# Patient Record
Sex: FEMALE | Race: WHITE | NOT HISPANIC OR LATINO | URBAN - METROPOLITAN AREA
[De-identification: names, ages, dates, MRNs, and addresses within clinical notes are randomized per-mention and may not be internally consistent; named-entity substitution may affect disease eponyms.]

---

## 2022-01-30 ENCOUNTER — INPATIENT (INPATIENT)
Facility: HOSPITAL | Age: 62
LOS: 1 days | Discharge: HOME | DRG: 286 | End: 2022-02-01
Attending: FAMILY MEDICINE | Admitting: HOSPITALIST
Payer: COMMERCIAL

## 2022-01-30 VITALS
WEIGHT: 244.93 LBS | RESPIRATION RATE: 16 BRPM | SYSTOLIC BLOOD PRESSURE: 153 MMHG | DIASTOLIC BLOOD PRESSURE: 81 MMHG | HEART RATE: 82 BPM | TEMPERATURE: 98 F | OXYGEN SATURATION: 99 % | HEIGHT: 66 IN

## 2022-01-30 LAB
ALBUMIN SERPL ELPH-MCNC: 3.7 G/DL — SIGNIFICANT CHANGE UP (ref 3.3–5.2)
ALP SERPL-CCNC: 131 U/L — HIGH (ref 40–120)
ALT FLD-CCNC: 24 U/L — SIGNIFICANT CHANGE UP
ANION GAP SERPL CALC-SCNC: 11 MMOL/L — SIGNIFICANT CHANGE UP (ref 5–17)
AST SERPL-CCNC: 31 U/L — SIGNIFICANT CHANGE UP
BASOPHILS # BLD AUTO: 0.05 K/UL — SIGNIFICANT CHANGE UP (ref 0–0.2)
BASOPHILS NFR BLD AUTO: 0.6 % — SIGNIFICANT CHANGE UP (ref 0–2)
BILIRUB SERPL-MCNC: 0.3 MG/DL — LOW (ref 0.4–2)
BUN SERPL-MCNC: 23.1 MG/DL — HIGH (ref 8–20)
CALCIUM SERPL-MCNC: 8.6 MG/DL — SIGNIFICANT CHANGE UP (ref 8.6–10.2)
CHLORIDE SERPL-SCNC: 106 MMOL/L — SIGNIFICANT CHANGE UP (ref 98–107)
CO2 SERPL-SCNC: 24 MMOL/L — SIGNIFICANT CHANGE UP (ref 22–29)
CREAT SERPL-MCNC: 0.74 MG/DL — SIGNIFICANT CHANGE UP (ref 0.5–1.3)
D DIMER BLD IA.RAPID-MCNC: 188 NG/ML DDU — SIGNIFICANT CHANGE UP
EOSINOPHIL # BLD AUTO: 0.25 K/UL — SIGNIFICANT CHANGE UP (ref 0–0.5)
EOSINOPHIL NFR BLD AUTO: 2.9 % — SIGNIFICANT CHANGE UP (ref 0–6)
FLUAV AG NPH QL: SIGNIFICANT CHANGE UP
FLUBV AG NPH QL: SIGNIFICANT CHANGE UP
GLUCOSE SERPL-MCNC: 99 MG/DL — SIGNIFICANT CHANGE UP (ref 70–99)
HCT VFR BLD CALC: 36.7 % — SIGNIFICANT CHANGE UP (ref 34.5–45)
HGB BLD-MCNC: 11.8 G/DL — SIGNIFICANT CHANGE UP (ref 11.5–15.5)
IMM GRANULOCYTES NFR BLD AUTO: 0.6 % — SIGNIFICANT CHANGE UP (ref 0–1.5)
LIDOCAIN IGE QN: 18 U/L — LOW (ref 22–51)
LYMPHOCYTES # BLD AUTO: 2.06 K/UL — SIGNIFICANT CHANGE UP (ref 1–3.3)
LYMPHOCYTES # BLD AUTO: 23.6 % — SIGNIFICANT CHANGE UP (ref 13–44)
MAGNESIUM SERPL-MCNC: 2 MG/DL — SIGNIFICANT CHANGE UP (ref 1.6–2.6)
MCHC RBC-ENTMCNC: 26.9 PG — LOW (ref 27–34)
MCHC RBC-ENTMCNC: 32.2 GM/DL — SIGNIFICANT CHANGE UP (ref 32–36)
MCV RBC AUTO: 83.8 FL — SIGNIFICANT CHANGE UP (ref 80–100)
MONOCYTES # BLD AUTO: 0.77 K/UL — SIGNIFICANT CHANGE UP (ref 0–0.9)
MONOCYTES NFR BLD AUTO: 8.8 % — SIGNIFICANT CHANGE UP (ref 2–14)
NEUTROPHILS # BLD AUTO: 5.54 K/UL — SIGNIFICANT CHANGE UP (ref 1.8–7.4)
NEUTROPHILS NFR BLD AUTO: 63.5 % — SIGNIFICANT CHANGE UP (ref 43–77)
NT-PROBNP SERPL-SCNC: 29 PG/ML — SIGNIFICANT CHANGE UP (ref 0–300)
PLATELET # BLD AUTO: 203 K/UL — SIGNIFICANT CHANGE UP (ref 150–400)
POTASSIUM SERPL-MCNC: 4.1 MMOL/L — SIGNIFICANT CHANGE UP (ref 3.5–5.3)
POTASSIUM SERPL-SCNC: 4.1 MMOL/L — SIGNIFICANT CHANGE UP (ref 3.5–5.3)
PROT SERPL-MCNC: 6.7 G/DL — SIGNIFICANT CHANGE UP (ref 6.6–8.7)
RBC # BLD: 4.38 M/UL — SIGNIFICANT CHANGE UP (ref 3.8–5.2)
RBC # FLD: 16.4 % — HIGH (ref 10.3–14.5)
RSV RNA NPH QL NAA+NON-PROBE: SIGNIFICANT CHANGE UP
SARS-COV-2 RNA SPEC QL NAA+PROBE: DETECTED
SODIUM SERPL-SCNC: 141 MMOL/L — SIGNIFICANT CHANGE UP (ref 135–145)
TROPONIN T SERPL-MCNC: <0.01 NG/ML — SIGNIFICANT CHANGE UP (ref 0–0.06)
WBC # BLD: 8.72 K/UL — SIGNIFICANT CHANGE UP (ref 3.8–10.5)
WBC # FLD AUTO: 8.72 K/UL — SIGNIFICANT CHANGE UP (ref 3.8–10.5)

## 2022-01-30 PROCEDURE — 99285 EMERGENCY DEPT VISIT HI MDM: CPT

## 2022-01-30 PROCEDURE — 71045 X-RAY EXAM CHEST 1 VIEW: CPT | Mod: 26

## 2022-01-30 PROCEDURE — 93010 ELECTROCARDIOGRAM REPORT: CPT

## 2022-01-30 RX ORDER — ASPIRIN/CALCIUM CARB/MAGNESIUM 324 MG
324 TABLET ORAL ONCE
Refills: 0 | Status: COMPLETED | OUTPATIENT
Start: 2022-01-30 | End: 2022-01-30

## 2022-01-30 RX ADMIN — Medication 324 MILLIGRAM(S): at 21:41

## 2022-01-30 NOTE — ED ADULT NURSE NOTE - OBJECTIVE STATEMENT
assumed pt 2230, pt a&ox3 states she bagan to have heart palpitations at home with SOB. pt states when the pain continued she called 911. pt placed on CM currently normal sinus, spo2 WNL pt states the pain and discomfort has improved since arrival to hospital.  Updated on the plan of care. Call bell within reach, bed locked in lowest position.  No signs of acute distress noted, safety maintained. .

## 2022-01-30 NOTE — ED PROVIDER NOTE - PROGRESS NOTE DETAILS
spoke with Dr. Bose, who review Patient history and noted that last year she had CT cardiac, calcium score of 184, and localized calcium score of 86 to LAD, and PET scan that showed mild ischemia. At this time, he requests aspirin and management of symptoms if they return. Patient will go to cath lab tomorrow. Patient resting comfortably.

## 2022-01-30 NOTE — ED ADULT TRIAGE NOTE - CHIEF COMPLAINT QUOTE
BIBEMS from home complaining of palpitations and BL finger tingling since 430p today. Also reports that en route she started having chest pressure. PMH of cardiac cath 15 years PTA with no stents on Lipitor for HLD. VSS.

## 2022-01-30 NOTE — ED PROVIDER NOTE - OBJECTIVE STATEMENT
62 y/o female with PMHx of arterial sclerosis, breast cancer and double mastectomy presents to the ED c/o palpitations. Patient was BIB EMS. Patient reports frequent palpitation that started 4 hours ago, accompanied by numb fingers, shoulder and chest discomfort, trouble breathing and tachycardia. Patient states palpitations have been improving fot the [ast 20 minutes, but still feel tingling   188/98 BP  no hx of HTN, calf pain, or swelling.   Denies N/V/D or chest pain in the past few days. Quit smoking 30 years ago. 62 y/o female with PMHx of arterial atherosclerosis, breast cancer, and double mastectomy presents to the ED c/o palpitations. Patient was BIB EMS and her BP was 188/98. Patient reports frequent palpitation that started 4 hours ago, accompanied by numb fingers, shoulder and chest discomfort, trouble breathing and tachycardia. Patient states palpitations have been improving for the last 20 minutes, but she still feels tingling. Denies Hx of HTN, calf pain, or swelling.   Denies fever, N/V/D or chest pain during the past few days. Quit smoking 30 years ago.

## 2022-01-31 ENCOUNTER — TRANSCRIPTION ENCOUNTER (OUTPATIENT)
Age: 62
End: 2022-01-31

## 2022-01-31 DIAGNOSIS — Z98.890 OTHER SPECIFIED POSTPROCEDURAL STATES: Chronic | ICD-10-CM

## 2022-01-31 DIAGNOSIS — I24.9 ACUTE ISCHEMIC HEART DISEASE, UNSPECIFIED: ICD-10-CM

## 2022-01-31 LAB
BLD GP AB SCN SERPL QL: SIGNIFICANT CHANGE UP
TROPONIN T SERPL-MCNC: <0.01 NG/ML — SIGNIFICANT CHANGE UP (ref 0–0.06)
TSH SERPL-MCNC: 1.82 UIU/ML — SIGNIFICANT CHANGE UP (ref 0.27–4.2)

## 2022-01-31 PROCEDURE — 99222 1ST HOSP IP/OBS MODERATE 55: CPT

## 2022-01-31 PROCEDURE — 93010 ELECTROCARDIOGRAM REPORT: CPT | Mod: 76

## 2022-01-31 PROCEDURE — 12345: CPT | Mod: NC

## 2022-01-31 RX ORDER — ATORVASTATIN CALCIUM 80 MG/1
1 TABLET, FILM COATED ORAL
Qty: 30 | Refills: 0
Start: 2022-01-31

## 2022-01-31 RX ORDER — LANOLIN ALCOHOL/MO/W.PET/CERES
3 CREAM (GRAM) TOPICAL AT BEDTIME
Refills: 0 | Status: DISCONTINUED | OUTPATIENT
Start: 2022-01-31 | End: 2022-02-01

## 2022-01-31 RX ORDER — ASPIRIN/CALCIUM CARB/MAGNESIUM 324 MG
1 TABLET ORAL
Qty: 0 | Refills: 0 | DISCHARGE
Start: 2022-01-31

## 2022-01-31 RX ORDER — ONDANSETRON 8 MG/1
4 TABLET, FILM COATED ORAL EVERY 8 HOURS
Refills: 0 | Status: DISCONTINUED | OUTPATIENT
Start: 2022-01-31 | End: 2022-02-01

## 2022-01-31 RX ORDER — ATORVASTATIN CALCIUM 80 MG/1
1 TABLET, FILM COATED ORAL
Qty: 0 | Refills: 0 | DISCHARGE
Start: 2022-01-31

## 2022-01-31 RX ORDER — ASPIRIN/CALCIUM CARB/MAGNESIUM 324 MG
325 TABLET ORAL DAILY
Refills: 0 | Status: DISCONTINUED | OUTPATIENT
Start: 2022-01-31 | End: 2022-02-01

## 2022-01-31 RX ORDER — ACETAMINOPHEN 500 MG
650 TABLET ORAL EVERY 6 HOURS
Refills: 0 | Status: DISCONTINUED | OUTPATIENT
Start: 2022-01-31 | End: 2022-02-01

## 2022-01-31 RX ORDER — SODIUM CHLORIDE 9 MG/ML
1000 INJECTION, SOLUTION INTRAVENOUS
Refills: 0 | Status: DISCONTINUED | OUTPATIENT
Start: 2022-01-31 | End: 2022-02-01

## 2022-01-31 RX ORDER — NITROGLYCERIN 6.5 MG
0.4 CAPSULE, EXTENDED RELEASE ORAL
Refills: 0 | Status: DISCONTINUED | OUTPATIENT
Start: 2022-01-31 | End: 2022-02-01

## 2022-01-31 RX ORDER — ATORVASTATIN CALCIUM 80 MG/1
1 TABLET, FILM COATED ORAL
Qty: 0 | Refills: 0 | DISCHARGE

## 2022-01-31 RX ORDER — ASPIRIN/CALCIUM CARB/MAGNESIUM 324 MG
81 TABLET ORAL ONCE
Refills: 0 | Status: COMPLETED | OUTPATIENT
Start: 2022-01-31 | End: 2022-01-31

## 2022-01-31 RX ORDER — ATORVASTATIN CALCIUM 80 MG/1
40 TABLET, FILM COATED ORAL AT BEDTIME
Refills: 0 | Status: DISCONTINUED | OUTPATIENT
Start: 2022-01-31 | End: 2022-02-01

## 2022-01-31 RX ADMIN — Medication 81 MILLIGRAM(S): at 16:46

## 2022-01-31 RX ADMIN — SODIUM CHLORIDE 100 MILLILITER(S): 9 INJECTION, SOLUTION INTRAVENOUS at 04:44

## 2022-01-31 NOTE — DISCHARGE NOTE PROVIDER - NSDCCPCAREPLAN_GEN_ALL_CORE_FT
PRINCIPAL DISCHARGE DIAGNOSIS  Diagnosis: Chest pain  Assessment and Plan of Treatment: ACS ruled out.  Continue Aspirin and Lipitor.  Follow up with Cardio in 1-2 weeks as scheduled.

## 2022-01-31 NOTE — H&P ADULT - ASSESSMENT
60 y/o female with episode of CP/Palpitations, Hx of Breast CA s/p B/L Mastectomy, s/p B/L reconstruction, HLD    Chest Pain:  -Currently no evidence of ACS  -No prior EKG for comparison  -Serial Trop/EKG  -Check Echo   -Check TSH   -DDimer neg, no hx of VTE  -Cont. Daily aspirin, statin  -Check A1c, FLP  -Likely LHC with reported pos. Cardiac CT, abnormal EKG, prior smoker, pos. family hx, and hx of HLD   -OOB, Ambulate  -NPO except meds pending Cardiology eval    HLD:  -Statin  -Check FLP    Breast reconstruction:  -No reported issues  -F/U with Breast surgeon as o/p     Discussed with ED, Patient  62 y/o female with episode of CP/Palpitations, COVID PCR pos. Hx of Breast CA s/p B/L Mastectomy, s/p B/L reconstruction, HLD    Chest Pain:  -Currently no evidence of ACS  -No prior EKG for comparison  -Serial Trop/EKG  -Check Echo   -Check TSH   -DDimer neg, no hx of VTE  -Cont. Daily aspirin, statin  -Check A1c, FLP  -Likely LHC with reported pos. Cardiac CT, abnormal EKG, prior smoker, pos. family hx, and hx of HLD   -OOB, Ambulate  -NPO except meds pending Cardiology eval    HLD:  -Statin  -Check FLP    Breast reconstruction:  -No reported issues  -F/U with Breast surgeon as o/p     COVID PCR pos.  -s/p vaccine and booster  -Reportedly had COVID symptoms in 11/21  -Not hypoxic, CXR as above  -Isolation, supportive care  -No indication for decadron/Remdesivir     Discussed with ED, Patient

## 2022-01-31 NOTE — DISCHARGE NOTE PROVIDER - NSDCMRMEDTOKEN_GEN_ALL_CORE_FT
Adult Aspirin Regimen 81 mg oral delayed release tablet: 1 tab(s) orally once a day  atorvastatin 40 mg oral tablet: 1 tab(s) orally once a day (at bedtime)

## 2022-01-31 NOTE — PROGRESS NOTE ADULT - SUBJECTIVE AND OBJECTIVE BOX
Patient was seen and examined at bedside around 8 am.  No more chest pain.  Denies shortness of breath, palpitations, nausea, vomiting, abdominal pain, headache or dizziness.    PHYSICAL EXAM:  Vital Signs   T(C): 36.8 (31 Jan 2022 09:09), Max: 36.9 (30 Jan 2022 19:53)  T(F): 98.3 (31 Jan 2022 09:09), Max: 98.4 (30 Jan 2022 19:53)  HR: 65 (31 Jan 2022 09:09) (65 - 82)  BP: 121/71 (31 Jan 2022 09:09) (121/71 - 153/81)  BP(mean): 95 (31 Jan 2022 04:07) (95 - 95)  RR: 16 (31 Jan 2022 09:09) (16 - 19)  SpO2: 98% (31 Jan 2022 09:09) (97% - 99%)  General: Elderly female sitting in bed comfortably. No acute distress  HEENT: EOMI. Clear conjunctivae. Moist mucus membrane  Neck: Supple.   Chest: CTA bilaterally - no wheezing, rales or rhonchi. + chest wall tenderness (recent surgery).   Heart: Normal S1 & S2 with RRR.    Abdomen: Soft. Mild tenderness around incisions from recent surgery. Non-distended. + BS  Ext: No pedal edema. No calf tenderness   Neuro: AAO x 3. No focal deficit. No speech disorder.  Skin: Warm and Dry  Psychiatry: Normal mood and affect    Labs, Radiology and EKG reviewed.    Plan:  Continue current treatment.  ECHO pending.  Possible cardiac cath today.    Please refer to H&P from earlier today for more details.      Discussed with Cardio and RN. 
                                                                                                Cardiac Cath NP Note           Patient is a 61y old  Female who presents with a chief complaint of Chest pain  Abnormal EKG (2022 10:21)    Overnight events: None   Plan: [facility] with Dr. Alejo Barahona     HPI:  62 y/o female with hx of HLD, breast cancer s/p B/L mastectomy 3/21 with reconstruction 10/21. No use of chemo/XRT. S/P C 15 years ago reported as normal, but states had cardiac CT in  showing "calcifications." She is normally fully functional with no limitations. She was in her normal state of health until yesterday evening when she had SSCP associated with palpitations and diaphoresis. Normally she can walk up multiple flights of stairs with no anginal symptoms. She is s/p COVID vaccine with booster in , but also states she had COVID in . Denies any recent fever, chills, N/V, SOB, HA, or LE edema. She called EMS with above. In ED initially hypertensive but not hypoxic, EKG with septal Q waves, initial Trop neg. Labs WNL, except COVID PCR positive.  (2022 04:07)      PAST MEDICAL & SURGICAL HISTORY:  Breast cancer    S/P mastectomy    Arterial atherosclerosis    H/O breast reconstruction        RADIOLOGY & ADDITIONAL STUDIES/DIAGNOSTIC TESTING:      ECG: NSR without ischemic changes     ECHO: 3/2020 normal normal EF without any signification valvular disease     CT Calcium scoring 2020 total coronary calcium score 184, , RCA 12    STRESS TEST: NUC equivocal      CARDIAC CATHETERIZATION: , normal LV sys function, nml LAD, 20% mRCA, 10% OM2         Allergies    No Known Allergies    Intolerances        MEDICATIONS  (STANDING):  aspirin  chewable 81 milliGRAM(s) Oral once  aspirin enteric coated 325 milliGRAM(s) Oral daily  atorvastatin 40 milliGRAM(s) Oral at bedtime  dextrose 5% + sodium chloride 0.9%. 1000 milliLiter(s) (100 mL/Hr) IV Continuous <Continuous>    MEDICATIONS  (PRN):  acetaminophen     Tablet .. 650 milliGRAM(s) Oral every 6 hours PRN Temp greater or equal to 38C (100.4F), Mild Pain (1 - 3)  aluminum hydroxide/magnesium hydroxide/simethicone Suspension 30 milliLiter(s) Oral every 4 hours PRN Dyspepsia  melatonin 3 milliGRAM(s) Oral at bedtime PRN Insomnia  nitroglycerin     SubLingual 0.4 milliGRAM(s) SubLingual every 5 minutes PRN Chest Pain  ondansetron Injectable 4 milliGRAM(s) IV Push every 8 hours PRN Nausea and/or Vomiting      FAMILY HISTORY:  FH: CAD (coronary artery disease) (Father, Mother)        SOCIAL HISTORY:     CIGARETTES: denies     ALCOHOL: rarely     REVIEW OF SYSTEMS:  General: No fevers/chills, or fatigue  HEENT: No visual disturbances, no hearing loss, no headaches, no epistaxis.  CV: No chest pain,no palpitations, no edema, no orthopnea, no PND, or VENCES  Respiratory: No dyspnea, no wheeze, no cough.  GI: no nausea/vomiting, no black/bloody stools.  : No hematuria  Musculoskeletal: No myalgias, no arthralgias, no back pain.    Vital Signs Last 24 Hrs  T(C): 36.7 (2022 16:23), Max: 36.9 (2022 19:53)  T(F): 98 (2022 16:23), Max: 98.4 (2022 19:53)  HR: 66 (2022 16:46) (65 - 82)  BP: 151/71 (2022 16:46) (121/71 - 153/81)  BP(mean): 95 (2022 04:07) (95 - 95)  RR: 18 (2022 16:46) (16 - 19)  SpO2: 100% (2022 16:46) (95% - 100%)    Daily Height in cm: 167.64 (2022 19:53)    Daily     I&O's Detail      PHYSICAL EXAM:   GENERAL: Pt lying comfortably, NAD.  ENMT: PERRL, +EOMI.  NECK: soft, Supple, No JVD,   CHEST/LUNG: Clear to auscultatation bilaterally; No wheezing.  HEART: S1S2+, Regular rate and rhythm; No murmurs.  ABDOMEN: Soft, Nontender, Nondistended; Bowel sounds present.  MUSCULOSKELETAL: Normal range of motion.  SKIN: No rashes or lesions.  NEURO: AAOX3, no focal deficits, no motor r sensory loss.  PSYCH: normal mood.  VASCULAR:   Radial +2 R/+2 L  Femoral +2 R/+2 L  PT +2 R/+2 L  DP +2 R/+2 L      INTERPRETATION OF TELEMETRY:    EC NSR     LABS:                        11.8   8.72  )-----------( 203      ( 2022 21:26 )             36.7         141  |  106  |  23.1<H>  ----------------------------<  99  4.1   |  24.0  |  0.74    Ca    8.6      2022 21:26  Mg     2.0         TPro  6.7  /  Alb  3.7  /  TBili  0.3<L>  /  DBili  x   /  AST  31  /  ALT  24  /  AlkPhos  131<H>      CARDIAC MARKERS ( 2022 08:04 )  x     / <0.01 ng/mL / x     / x     / x      CARDIAC MARKERS ( 2022 21:26 )  x     / <0.01 ng/mL / x     / x     / x              I&O's Summary    BNPSerum Pro-Brain Natriuretic Peptide: 29 pg/mL ( @ 21:26)    Serum Pro-Brain Natriuretic Peptide: 29 pg/mL (22 @ 21:26)            
[place] [facility] - [facility]                                   [address] [address]                                                      PHONE: [phone]                                                         FAX: [phone]  [email or web address]  ---------------------------------------------------------------------------------------------------------------------------------    Please see cath report.      Non significant disease of LAD (negative iFR) for med tx  ASA and Statin.  Observe tele tonight and hopeful dc home in am    Alejo Barahona MD    Office [phone]  Cell     [phone]

## 2022-01-31 NOTE — DISCHARGE NOTE PROVIDER - PROVIDER TOKENS
PROVIDER:[TOKEN:[[id number]],FOLLOWUP:[2 weeks]],PROVIDER:[TOKEN:[[id number]],FOLLOWUP:[Routine]]

## 2022-01-31 NOTE — CONSULT NOTE ADULT - SUBJECTIVE AND OBJECTIVE BOX
[place] [facility] - [facility], [facility]                                   [address] [place] [address]                                                      PHONE: [phone]                                                         FAX: [phone]  [email or web address]  ---------------------------------------------------------------------------------------------------------------------------------    Reason for Consult: chest pain     HPI:  POLINA HERNÁNDEZ is an 61y Female with history of HLD CAD prior CT coronary calcium 184 to LAD territory with equivocal NUC1/2020, breast cancer s/p B/L mastectomy 3/21 with reconstruction 10/21 without chemo/XRT. Patient is normally fully functional with no limitations. She was in her normal state of health until yesterday evening when she had substernal chest pain associated with palpitations and diaphoresis. Normally she can walk up multiple flights of stairs with no anginal symptoms. She is s/p COVID vaccine with booster in 11/21, but also states she had COVID in 11/21. Denies any recent fever, chills, N/V, SOB, HA, or LE edema. She called EMS with above. D-Dimer normal and trop negative thus far.      PAST MEDICAL & SURGICAL HISTORY:  Breast cancer    S/P mastectomy    Arterial atherosclerosis    H/O breast reconstruction        No Known Allergies      MEDICATIONS  (STANDING):  aspirin enteric coated 325 milliGRAM(s) Oral daily  atorvastatin 40 milliGRAM(s) Oral at bedtime  dextrose 5% + sodium chloride 0.9%. 1000 milliLiter(s) (100 mL/Hr) IV Continuous <Continuous>    MEDICATIONS  (PRN):  acetaminophen     Tablet .. 650 milliGRAM(s) Oral every 6 hours PRN Temp greater or equal to 38C (100.4F), Mild Pain (1 - 3)  aluminum hydroxide/magnesium hydroxide/simethicone Suspension 30 milliLiter(s) Oral every 4 hours PRN Dyspepsia  melatonin 3 milliGRAM(s) Oral at bedtime PRN Insomnia  nitroglycerin     SubLingual 0.4 milliGRAM(s) SubLingual every 5 minutes PRN Chest Pain  ondansetron Injectable 4 milliGRAM(s) IV Push every 8 hours PRN Nausea and/or Vomiting      Social History:  Cigarettes:      none               Alchohol:       none           Illicit Drug Abuse:  none     ROS: Negative other than as mentioned in HPI.    Vital Signs Last 24 Hrs  T(C): 36.8 (31 Jan 2022 09:09), Max: 36.9 (30 Jan 2022 19:53)  T(F): 98.3 (31 Jan 2022 09:09), Max: 98.4 (30 Jan 2022 19:53)  HR: 65 (31 Jan 2022 09:09) (65 - 82)  BP: 121/71 (31 Jan 2022 09:09) (121/71 - 153/81)  BP(mean): 95 (31 Jan 2022 04:07) (95 - 95)  RR: 16 (31 Jan 2022 09:09) (16 - 19)  SpO2: 98% (31 Jan 2022 09:09) (97% - 99%)  ICU Vital Signs Last 24 Hrs  POLINA HERNÁNDEZ  I&O's Detail    I&O's Summary    Drug Dosing Weight  POLINA HERNÁNDEZ      PHYSICAL EXAM:  General: Appears well developed, alert and cooperative.  HEENT: Head; normocephalic, atraumatic.  Eyes: Pupils reactive, cornea wnl.  Neck: Supple, no nodes adenopathy, no NVD or carotid bruit or thyromegaly.  CARDIOVASCULAR: Normal S1 and S2, 2/6 murmur, rub, gallop or lift.   LUNGS: No rales, rhonchi or wheeze. Normal breath sounds bilaterally.  ABDOMEN: Soft, nontender without mass or organomegaly. bowel sounds normoactive.  EXTREMITIES: No clubbing, cyanosis or edema. Distal pulses wnl.   SKIN: warm and dry with normal turgor.  NEURO: Alert/oriented x 3/normal motor exam. No pathologic reflexes.    PSYCH: normal affect.        LABS:                        11.8   8.72  )-----------( 203      ( 30 Jan 2022 21:26 )             36.7     01-30    141  |  106  |  23.1<H>  ----------------------------<  99  4.1   |  24.0  |  0.74    Ca    8.6      30 Jan 2022 21:26  Mg     2.0     01-30    TPro  6.7  /  Alb  3.7  /  TBili  0.3<L>  /  DBili  x   /  AST  31  /  ALT  24  /  AlkPhos  131<H>  01-30    POLINA HERNÁNDEZ  CARDIAC MARKERS ( 31 Jan 2022 08:04 )  x     / <0.01 ng/mL / x     / x     / x      CARDIAC MARKERS ( 30 Jan 2022 21:26 )  x     / <0.01 ng/mL / x     / x     / x                RADIOLOGY & ADDITIONAL STUDIES:    INTERPRETATION OF TELEMETRY (personally reviewed):    ECG: NSR without ischemic changes     ECHO: 3/2020 normal normal EF without any signification valvular disease         STRESS TEST: NUC equivocal      CARDIAC CATHETERIZATION: Pending     Assessment and Plan:  In summary, POLINA HERNÁNDEZ is an 61y Female with past medical history significant for with history of HLD CAD prior CT coronary calcium 184 to LAD territory with equivocal NUC1/2020, breast cancer s/p B/L mastectomy 3/21 with reconstruction 10/21 without chemo/XRT. Patient is normally fully functional with no limitations. She was in her normal state of health until yesterday evening when she had substernal chest pain associated with palpitations and diaphoresis. Normally she can walk up multiple flights of stairs with no anginal symptoms. She is s/p COVID vaccine with booster in 11/21, but also states she had COVID in 11/21. Denies any recent fever, chills, N/V, SOB, HA, or LE edema. She called EMS with above. D-Dimer normal and trop negative thus far.       Left heart cath   Awaiting TTE to re-evaluate LV EF and rule out significant valvular disease   ASA and statin therapy

## 2022-01-31 NOTE — DISCHARGE NOTE PROVIDER - ATTENDING DISCHARGE PHYSICAL EXAMINATION:
PHYSICAL EXAM:  Vital Signs   T(C): 36.4 (01 Feb 2022 04:00), Max: 36.7 (31 Jan 2022 16:23)  T(F): 97.5 (01 Feb 2022 04:00), Max: 98 (31 Jan 2022 16:23)  HR: 60 (01 Feb 2022 04:00) (60 - 77)  BP: 130/80 (01 Feb 2022 04:00) (130/80 - 159/83)  RR: 18 (01 Feb 2022 04:00) (18 - 18)  SpO2: 95% (01 Feb 2022 04:00) (94% - 100%)  General: Elderly female sitting in bed comfortably. No acute distress  HEENT: EOMI. Clear conjunctivae. Moist mucus membrane  Neck: Supple.   Chest: CTA bilaterally - no wheezing, rales or rhonchi. + chest wall tenderness (recent surgery).   Heart: Normal S1 & S2 with RRR.    Abdomen: Soft. Mild tenderness around incisions from recent surgery. Non-distended. + BS  Ext: No pedal edema. No calf tenderness   Neuro: AAO x 3. No focal deficit. No speech disorder.  Skin: Warm and Dry  Psychiatry: Normal mood and affect

## 2022-01-31 NOTE — H&P ADULT - REASON FOR ADMISSION
TCM assessment completed.  The patient is scheduled for a TCM/HFU with you on 12/09/2024.  Thank you. 
Chest pain  Abnormal EKG

## 2022-01-31 NOTE — ED ADULT NURSE REASSESSMENT NOTE - NS ED NURSE REASSESS COMMENT FT1
pt aaox3 breathing on ra no sob no cp pt being admitted pt npo pt is going for a procedure vs stable no distress noted
pt remains on CM currently NS and  spo2 WNL, awaiting results and plan of care from MD. no pain or discomfort at this time pt currently resting on stretcher, safety maintained

## 2022-01-31 NOTE — DISCHARGE NOTE NURSING/CASE MANAGEMENT/SOCIAL WORK - PATIENT PORTAL LINK FT
You can access the FollowMyHealth Patient Portal offered by [facility] by registering at the following website: [email or web address]. By joining [facility]’s FollowMyHealth portal, you will also be able to view your health information using other applications (apps) compatible with our system.

## 2022-01-31 NOTE — DISCHARGE NOTE NURSING/CASE MANAGEMENT/SOCIAL WORK - NSDCPEFALRISK_GEN_ALL_CORE
For information on Fall & Injury Prevention, visit: [email or web address] OR  [email or web address] OR  https://www.cdc.gov/steadi/patient.html

## 2022-01-31 NOTE — PROGRESS NOTE ADULT - ASSESSMENT
Risk Assessments:  ASA: 2  Mallampati: 2  GFR: 87  Cr: 0.74  BRA: 1.9      Impression:    Plan:    -plan for [facility] with Dr. Alejo Barahona   -patient seen and examined  -confirmed appropriate NPO duration  -ECG and Labs reviewed  -Aspirin 81mg po pre-cath  -procedure discussed with patient; risks and benefits explained, questions answered  -consent obtained by attending IC Risk Assessments:  ASA: 2  Mallampati: 2  GFR: 87  Cr: 0.74  BRA: 1.9    Plan:    -plan for [facility] with Dr. Alejo Barahona   -patient seen and examined  -confirmed appropriate NPO duration  -ECG and Labs reviewed  -Aspirin 81mg po pre-cath  -procedure discussed with patient; risks and benefits explained, questions answered  -consent obtained by attending IC                                     POST CATH ADDENDUM    Now s/p LHC via RFA with Dr. Barahona, tolerated procedure well. Pt arrived to recovery in NAD and Hemodynamically stable,  access site stable, no bleed/hematoma, distal pulse +, neurovascular intact.   Intraprocedurally: Pt received IV sedation, Heparin 12,000 units IV, Ancef 2g IVPB, Omnipaque 108ml, closure device MNYX   Findings:   < from: Cardiac Catheterization (22 @ 18:25) >  [facility]  Department of Cardiology  [address] [place] [address]  [phone]  Cath Lab Report -- Comprehensive Report  Patient: POLINA HERNÁNDEZ  Study date: 2022  Account number: [id number]  MR number: [id number]  : 1960  Gender: Female  Race: W  Case Physician(s):  Alejo Barahona MD  Referring Physician:  INDICATIONS: Unstable angina - CCS4.  HISTORY: The patient has hypertension and medication-treated dyslipidemia.  PROCEDURE:  --  Left heart catheterization with ventriculography.  --  Left coronary angiography.  --  Right coronary angiography.  --  Sonosite.  --  Pressure Wire procedure.  --  Hemostasis with Perclose.  TECHNIQUE: Cardiac catheterization performed electively.  Local anesthetic given. Right femoral artery access. Sheath exchange. The  sheath in the right femoral artery was exchanged. Left heart  catheterization. Ventriculography was performed. Left coronary artery  angiography. The vessel was injected utilizing a catheter. Right coronary  artery angiography. The vessel was injected utilizing a catheter.  Sonosite. Pressure Wire procedure. Hemostasis with Perclose. RADIATION  EXPOSURE: 7.8 min.  CONTRAST GIVEN: Omnipaque 108 ml.  MEDICATIONS GIVEN: Midazolam, 1 mg, IV. Fentanyl, 50 mcg, IV. Midazolam,  0.5 mg, IV. Midazolam, 0.5 mg, IV. Nitroglycerin, 200 mcg, intracoronary.  Heparin, 57713 units, IV. 1% Lidocaine, 10 ml, subcutaneously. 0.9NS, 200  ml, IV. Ancef, 2 g, IV.  VENTRICLES: LVEF 55%  CORONARY VESSELS: The coronary circulation is right dominant.  LM:   --  LM: Normal.  LAD:   --  LAD: iFR index of 0.91  --  Mid LAD: There was a 50 % stenosis.  CX:   --  Circumflex: Normal.  RCA:   --  Proximal RCA: There was a 20 % stenosis.  --  Mid RCA: There was a 20% stenosis.  COMPLICATIONS: There were no complications. No complications occurred  during the cath lab visit.  DIAGNOSTIC IMPRESSIONS: Non significant LAD disease (negative iFR)  DIAGNOSTIC RECOMMENDATIONS: of Medically refractory symptoms then PCI of  LAD The patient should continue with the present medications. ASA and  statin  INTERVENTIONAL IMPRESSIONS: Non significant LAD disease (negative iFR)  INTERVENTIONAL RECOMMENDATIONS: of Medically refractory symptoms then PCI  of LAD  Prepared and signed by  Alejo Barahona MD  Signed 2022 19:18:58        Plan:  -Dispo return to 5 tower IP room, after 2 hours bedrest and post procedure monitoring   -Post procedure management/monitoring per protocol  -Access site precautions  -Bedrest x 2 hours post procedure until 2130  -Labs and EKG in am  -medical mgmt with ASA and high dose statin   -Cont statin therapy with Lipitor 40mg daily  -Educated regarding post procedure management and care  -Discussed the importance of RF modification  -F/U outpt in 1-2 weeks with Cardiologist Dr. Hunter   -DISPO: Plan for D/C in am if remains HDS, ECG and labs in am stable and without complications    Plan of care discussed with patient and Dr. Alejo Barahona  Risk Assessments:  ASA: 2  Mallampati: 2  GFR: 87  Cr: 0.74  BRA: 1.9    Plan:    -plan for [facility] with Dr. Alejo Barahona   -patient seen and examined  -confirmed appropriate NPO duration  -ECG and Labs reviewed  -Aspirin 81mg po pre-cath  -procedure discussed with patient; risks and benefits explained, questions answered  -consent obtained by attending IC                                     POST CATH ADDENDUM    Now s/p LHC via RFA with Dr. Barahona, tolerated procedure well. Pt arrived to recovery in NAD and Hemodynamically stable,  access site stable, no bleed/hematoma, distal pulse +, neurovascular intact.   Intraprocedurally: Pt received IV sedation, Heparin 12,000 units IV, Ancef 2g IVPB, Omnipaque 108ml, closure device MNYX   Findings:   < from: Cardiac Catheterization (22 @ 18:25) >  [facility]  Department of Cardiology  [address] [place] [address]  [phone]  Cath Lab Report -- Comprehensive Report  Patient: POLINA HERNÁNDEZ  Study date: 2022  Account number: [id number]  MR number: [id number]  : 1960  Gender: Female  Race: W  Case Physician(s):  Alejo Barahona MD  Referring Physician:  INDICATIONS: Unstable angina - CCS4.  HISTORY: The patient has hypertension and medication-treated dyslipidemia.  PROCEDURE:  --  Left heart catheterization with ventriculography.  --  Left coronary angiography.  --  Right coronary angiography.  --  Sonosite.  --  Pressure Wire procedure.  --  Hemostasis with Perclose.  TECHNIQUE: Cardiac catheterization performed electively.  Local anesthetic given. Right femoral artery access. Sheath exchange. The  sheath in the right femoral artery was exchanged. Left heart  catheterization. Ventriculography was performed. Left coronary artery  angiography. The vessel was injected utilizing a catheter. Right coronary  artery angiography. The vessel was injected utilizing a catheter.  Sonosite. Pressure Wire procedure. Hemostasis with Perclose. RADIATION  EXPOSURE: 7.8 min.  CONTRAST GIVEN: Omnipaque 108 ml.  MEDICATIONS GIVEN: Midazolam, 1 mg, IV. Fentanyl, 50 mcg, IV. Midazolam,  0.5 mg, IV. Midazolam, 0.5 mg, IV. Nitroglycerin, 200 mcg, intracoronary.  Heparin, 88292 units, IV. 1% Lidocaine, 10 ml, subcutaneously. 0.9NS, 200  ml, IV. Ancef, 2 g, IV.  VENTRICLES: LVEF 55%  CORONARY VESSELS: The coronary circulation is right dominant.  LM:   --  LM: Normal.  LAD:   --  LAD: iFR index of 0.91  --  Mid LAD: There was a 50 % stenosis.  CX:   --  Circumflex: Normal.  RCA:   --  Proximal RCA: There was a 20 % stenosis.  --  Mid RCA: There was a 20% stenosis.  COMPLICATIONS: There were no complications. No complications occurred  during the cath lab visit.  DIAGNOSTIC IMPRESSIONS: Non significant LAD disease (negative iFR)  DIAGNOSTIC RECOMMENDATIONS: of Medically refractory symptoms then PCI of  LAD The patient should continue with the present medications. ASA and  statin  INTERVENTIONAL IMPRESSIONS: Non significant LAD disease (negative iFR)  INTERVENTIONAL RECOMMENDATIONS: of Medically refractory symptoms then PCI  of LAD  Prepared and signed by  Alejo Barahona MD  Signed 2022 19:18:58        Plan:  -Dispo return to 5 tower IP room, after 2 hours bedrest and post procedure monitoring   -Post procedure management/monitoring per protocol  -Access site precautions  -Bedrest x 2 hours post procedure until 2230 in the setting of IV heparin and iFR   -Labs and EKG in am  -medical mgmt with ASA and high dose statin   -Cont statin therapy with Lipitor 40mg daily  -Educated regarding post procedure management and care  -Discussed the importance of RF modification  -F/U outpt in 1-2 weeks with Cardiologist Dr. Hunter   -DISPO: Plan for D/C in am if remains HDS, ECG and labs in am stable and without complications    Plan of care discussed with patient and Dr. Alejo Barahona

## 2022-01-31 NOTE — DISCHARGE NOTE PROVIDER - NSDCCPTREATMENT_GEN_ALL_CORE_FT
PRINCIPAL PROCEDURE  Procedure: Left heart cardiac cath  Findings and Treatment: Cath Lab Report -- Comprehensive Report  Patient: POLINA HERNÁNDEZ  Study date: 2022  Account number: [id number]  MR number: [id number]  : 1960  Gender: Female  Race: W  Case Physician(s):  Alejo Barahona MD  Referring Physician:  INDICATIONS: Unstable angina - CCS4.  HISTORY: The patient has hypertension and medication-treated dyslipidemia.  PROCEDURE:  --  Left heart catheterization with ventriculography.  --  Left coronary angiography.  --  Right coronary angiography.  --  Sonosite.  --  Pressure Wire procedure.  --  Hemostasis with Perclose.  TECHNIQUE: Cardiac catheterization performed electively.  Local anesthetic given. Right femoral artery access. Sheath exchange. The  sheath in the right femoral artery was exchanged. Left heart  catheterization. Ventriculography was performed. Left coronary artery  angiography. The vessel was injected utilizing a catheter. Right coronary  artery angiography. The vessel was injected utilizing a catheter.  Sonosite. Pressure Wire procedure. Hemostasis with Perclose. RADIATION  EXPOSURE: 7.8 min.  CONTRAST GIVEN: Omnipaque 108 ml.  MEDICATIONS GIVEN: Midazolam, 1 mg, IV. Fentanyl, 50 mcg, IV. Midazolam,  0.5 mg, IV. Midazolam, 0.5 mg, IV. Nitroglycerin, 200 mcg, intracoronary.  Heparin, 18399 units, IV. 1% Lidocaine, 10 ml, subcutaneously. 0.9NS, 200  ml, IV. Ancef, 2 g, IV.  VENTRICLES: LVEF 55%  CORONARY VESSELS: The coronary circulation is right dominant.  LM:   --  LM: Normal.  LAD:   --  LAD: iFR index of 0.91  --  Mid LAD: There was a 50 % stenosis.  CX:   --  Circumflex: Normal.  RCA:   --  Proximal RCA: There was a 20 % stenosis.  --  Mid RCA: There was a 20% stenosis.  COMPLICATIONS: There were no complications. No complications occurred  during the cath lab visit.  DIAGNOSTIC IMPRESSIONS: Non significant LAD disease (negative iFR)  DIAGNOSTIC RECOMMENDATIONS: of Medically refractory symptoms then PCI of  LAD The patient should continue with the present medications. ASA and  statin  INTERVENTIONAL IMPRESSIONS: Non significant LAD disease (negative iFR)  INTERV

## 2022-01-31 NOTE — DISCHARGE NOTE PROVIDER - HOSPITAL COURSE
Brief Hospital Course: 60 y/o female with hx of HLD, breast cancer s/p B/L mastectomy 3/21 with reconstruction 10/21. No use of chemo/XRT. S/P LHC 15 years ago reported as normal, but states had cardiac CT in 11/21 showing "calcifications." She is normally fully functional with no limitations. She was in her normal state of health until yesterday evening when she had SSCP associated with palpitations and diaphoresis. Normally she can walk up multiple flights of stairs with no anginal symptoms. EKG nonishemic, trops negative x 2. Seen by Cardiology and was recommended [facility] which showed nonobstructive CAD. ACS ruled out. She is feeling much better and is stable for discharge.

## 2022-01-31 NOTE — H&P ADULT - HISTORY OF PRESENT ILLNESS
62 y/o female with hx of HLD, breast cancer s/p B/L mastectomy 3/21 with reconstruction 10/21. No use of chemo/XRT. S/P LHC 15 years ago reported as normal, but states had cardiac CT in 11/21 showing "calcifications." She is normally fully functional with no limitations. She was in her normal state of health until yesterday evening when she had SSCP associated with palpitations and diaphoresis. Normally she can walk up multiple flights of stairs with no anginal symptoms. She is s/p COVID vaccine with booster in 11/21, but also states she had COVID in 11/21. Denies any recent fever, chills, N/V, SOB, HA, or LE edema. She called EMS with above. In ED initially hypertensive but not hypoxic, EKG with septal Q waves, initial Trop neg. Labs WNL, except COVID PCR positive.

## 2022-01-31 NOTE — DISCHARGE NOTE PROVIDER - CARE PROVIDER_API CALL
Belia Hunter)  Internal Medicine  [address] [place] [id number]  Phone: [phone]  Fax: [phone]  Follow Up Time: 2 weeks    MARKIE ELIZABETH  [address] [address]  Phone: ()-  Fax: ()-  Follow Up Time: Routine

## 2022-02-01 ENCOUNTER — TRANSCRIPTION ENCOUNTER (OUTPATIENT)
Age: 62
End: 2022-02-01

## 2022-02-01 VITALS
RESPIRATION RATE: 18 BRPM | HEART RATE: 74 BPM | DIASTOLIC BLOOD PRESSURE: 74 MMHG | TEMPERATURE: 98 F | OXYGEN SATURATION: 97 % | SYSTOLIC BLOOD PRESSURE: 126 MMHG

## 2022-02-01 LAB
A1C WITH ESTIMATED AVERAGE GLUCOSE RESULT: 5.2 % — SIGNIFICANT CHANGE UP (ref 4–5.6)
ALBUMIN SERPL ELPH-MCNC: 3.7 G/DL — SIGNIFICANT CHANGE UP (ref 3.3–5.2)
ALP SERPL-CCNC: 112 U/L — SIGNIFICANT CHANGE UP (ref 40–120)
ALT FLD-CCNC: 18 U/L — SIGNIFICANT CHANGE UP
ANION GAP SERPL CALC-SCNC: 12 MMOL/L — SIGNIFICANT CHANGE UP (ref 5–17)
AST SERPL-CCNC: 18 U/L — SIGNIFICANT CHANGE UP
BASOPHILS # BLD AUTO: 0.04 K/UL — SIGNIFICANT CHANGE UP (ref 0–0.2)
BASOPHILS NFR BLD AUTO: 0.6 % — SIGNIFICANT CHANGE UP (ref 0–2)
BILIRUB SERPL-MCNC: 0.6 MG/DL — SIGNIFICANT CHANGE UP (ref 0.4–2)
BUN SERPL-MCNC: 12.7 MG/DL — SIGNIFICANT CHANGE UP (ref 8–20)
CALCIUM SERPL-MCNC: 8.3 MG/DL — LOW (ref 8.6–10.2)
CHLORIDE SERPL-SCNC: 106 MMOL/L — SIGNIFICANT CHANGE UP (ref 98–107)
CHOLEST SERPL-MCNC: 174 MG/DL — SIGNIFICANT CHANGE UP
CO2 SERPL-SCNC: 24 MMOL/L — SIGNIFICANT CHANGE UP (ref 22–29)
CREAT SERPL-MCNC: 0.67 MG/DL — SIGNIFICANT CHANGE UP (ref 0.5–1.3)
EOSINOPHIL # BLD AUTO: 0.22 K/UL — SIGNIFICANT CHANGE UP (ref 0–0.5)
EOSINOPHIL NFR BLD AUTO: 3.4 % — SIGNIFICANT CHANGE UP (ref 0–6)
ESTIMATED AVERAGE GLUCOSE: 103 MG/DL — SIGNIFICANT CHANGE UP (ref 68–114)
GLUCOSE SERPL-MCNC: 145 MG/DL — HIGH (ref 70–99)
HCT VFR BLD CALC: 36.2 % — SIGNIFICANT CHANGE UP (ref 34.5–45)
HCV AB S/CO SERPL IA: 0.11 S/CO — SIGNIFICANT CHANGE UP (ref 0–0.99)
HCV AB SERPL-IMP: SIGNIFICANT CHANGE UP
HDLC SERPL-MCNC: 47 MG/DL — LOW
HGB BLD-MCNC: 11.6 G/DL — SIGNIFICANT CHANGE UP (ref 11.5–15.5)
IMM GRANULOCYTES NFR BLD AUTO: 0.3 % — SIGNIFICANT CHANGE UP (ref 0–1.5)
LIPID PNL WITH DIRECT LDL SERPL: 111 MG/DL — HIGH
LYMPHOCYTES # BLD AUTO: 1.47 K/UL — SIGNIFICANT CHANGE UP (ref 1–3.3)
LYMPHOCYTES # BLD AUTO: 23 % — SIGNIFICANT CHANGE UP (ref 13–44)
MAGNESIUM SERPL-MCNC: 2.2 MG/DL — SIGNIFICANT CHANGE UP (ref 1.6–2.6)
MCHC RBC-ENTMCNC: 26.8 PG — LOW (ref 27–34)
MCHC RBC-ENTMCNC: 32 GM/DL — SIGNIFICANT CHANGE UP (ref 32–36)
MCV RBC AUTO: 83.6 FL — SIGNIFICANT CHANGE UP (ref 80–100)
MONOCYTES # BLD AUTO: 0.6 K/UL — SIGNIFICANT CHANGE UP (ref 0–0.9)
MONOCYTES NFR BLD AUTO: 9.4 % — SIGNIFICANT CHANGE UP (ref 2–14)
NEUTROPHILS # BLD AUTO: 4.03 K/UL — SIGNIFICANT CHANGE UP (ref 1.8–7.4)
NEUTROPHILS NFR BLD AUTO: 63.3 % — SIGNIFICANT CHANGE UP (ref 43–77)
NON HDL CHOLESTEROL: 127 MG/DL — SIGNIFICANT CHANGE UP
PLATELET # BLD AUTO: 191 K/UL — SIGNIFICANT CHANGE UP (ref 150–400)
POTASSIUM SERPL-MCNC: 4.1 MMOL/L — SIGNIFICANT CHANGE UP (ref 3.5–5.3)
POTASSIUM SERPL-SCNC: 4.1 MMOL/L — SIGNIFICANT CHANGE UP (ref 3.5–5.3)
PROT SERPL-MCNC: 6.5 G/DL — LOW (ref 6.6–8.7)
RBC # BLD: 4.33 M/UL — SIGNIFICANT CHANGE UP (ref 3.8–5.2)
RBC # FLD: 16.8 % — HIGH (ref 10.3–14.5)
SODIUM SERPL-SCNC: 142 MMOL/L — SIGNIFICANT CHANGE UP (ref 135–145)
TRIGL SERPL-MCNC: 78 MG/DL — SIGNIFICANT CHANGE UP
WBC # BLD: 6.38 K/UL — SIGNIFICANT CHANGE UP (ref 3.8–10.5)
WBC # FLD AUTO: 6.38 K/UL — SIGNIFICANT CHANGE UP (ref 3.8–10.5)

## 2022-02-01 PROCEDURE — C1760: CPT

## 2022-02-01 PROCEDURE — 86900 BLOOD TYPING SEROLOGIC ABO: CPT

## 2022-02-01 PROCEDURE — 99153 MOD SED SAME PHYS/QHP EA: CPT

## 2022-02-01 PROCEDURE — 84484 ASSAY OF TROPONIN QUANT: CPT

## 2022-02-01 PROCEDURE — 93458 L HRT ARTERY/VENTRICLE ANGIO: CPT

## 2022-02-01 PROCEDURE — C1769: CPT

## 2022-02-01 PROCEDURE — 99239 HOSP IP/OBS DSCHRG MGMT >30: CPT | Mod: GC

## 2022-02-01 PROCEDURE — 86901 BLOOD TYPING SEROLOGIC RH(D): CPT

## 2022-02-01 PROCEDURE — 83735 ASSAY OF MAGNESIUM: CPT

## 2022-02-01 PROCEDURE — 80061 LIPID PANEL: CPT

## 2022-02-01 PROCEDURE — 80053 COMPREHEN METABOLIC PANEL: CPT

## 2022-02-01 PROCEDURE — 86850 RBC ANTIBODY SCREEN: CPT

## 2022-02-01 PROCEDURE — 93571 IV DOP VEL&/PRESS C FLO 1ST: CPT | Mod: LD

## 2022-02-01 PROCEDURE — 93005 ELECTROCARDIOGRAM TRACING: CPT

## 2022-02-01 PROCEDURE — C1887: CPT

## 2022-02-01 PROCEDURE — 36415 COLL VENOUS BLD VENIPUNCTURE: CPT

## 2022-02-01 PROCEDURE — 86803 HEPATITIS C AB TEST: CPT

## 2022-02-01 PROCEDURE — 83880 ASSAY OF NATRIURETIC PEPTIDE: CPT

## 2022-02-01 PROCEDURE — 99285 EMERGENCY DEPT VISIT HI MDM: CPT

## 2022-02-01 PROCEDURE — C1894: CPT

## 2022-02-01 PROCEDURE — 85379 FIBRIN DEGRADATION QUANT: CPT

## 2022-02-01 PROCEDURE — 71045 X-RAY EXAM CHEST 1 VIEW: CPT

## 2022-02-01 PROCEDURE — 84443 ASSAY THYROID STIM HORMONE: CPT

## 2022-02-01 PROCEDURE — 83036 HEMOGLOBIN GLYCOSYLATED A1C: CPT

## 2022-02-01 PROCEDURE — 87637 SARSCOV2&INF A&B&RSV AMP PRB: CPT

## 2022-02-01 PROCEDURE — 99152 MOD SED SAME PHYS/QHP 5/>YRS: CPT

## 2022-02-01 PROCEDURE — 83690 ASSAY OF LIPASE: CPT

## 2022-02-01 PROCEDURE — 85025 COMPLETE CBC W/AUTO DIFF WBC: CPT

## 2022-02-01 RX ADMIN — Medication 650 MILLIGRAM(S): at 00:05

## 2022-02-01 RX ADMIN — SODIUM CHLORIDE 100 MILLILITER(S): 9 INJECTION, SOLUTION INTRAVENOUS at 01:08

## 2022-02-01 RX ADMIN — Medication 325 MILLIGRAM(S): at 11:07

## 2022-02-01 NOTE — CHART NOTE - NSCHARTNOTEFT_GEN_A_CORE
Interventional cardiology NP F/U site check: cardiology   care managed by Glenn: sees Dr. Hunter  awake alert/ denies complaints of chest pain/sob/dizziness/palps  Right groin soft , no hematoma distal pulses palpable 2+  groin care reviewed with patient   she has follow up appt with Dr. Hunter next week

## 2022-09-12 PROBLEM — Z90.10 ACQUIRED ABSENCE OF UNSPECIFIED BREAST AND NIPPLE: Chronic | Status: ACTIVE | Noted: 2022-01-31

## 2022-09-12 PROBLEM — C50.919 MALIGNANT NEOPLASM OF UNSPECIFIED SITE OF UNSPECIFIED FEMALE BREAST: Chronic | Status: ACTIVE | Noted: 2022-01-31

## 2022-09-12 PROBLEM — I70.8 ATHEROSCLEROSIS OF OTHER ARTERIES: Chronic | Status: ACTIVE | Noted: 2022-01-31

## 2022-11-11 ENCOUNTER — APPOINTMENT (OUTPATIENT)
Dept: GASTROENTEROLOGY | Facility: CLINIC | Age: 62
End: 2022-11-11

## 2022-11-11 VITALS
HEIGHT: 66 IN | WEIGHT: 245 LBS | DIASTOLIC BLOOD PRESSURE: 72 MMHG | OXYGEN SATURATION: 98 % | RESPIRATION RATE: 14 BRPM | SYSTOLIC BLOOD PRESSURE: 112 MMHG | HEART RATE: 68 BPM | BODY MASS INDEX: 39.37 KG/M2

## 2022-11-11 DIAGNOSIS — Z78.9 OTHER SPECIFIED HEALTH STATUS: ICD-10-CM

## 2022-11-11 DIAGNOSIS — Z12.11 ENCOUNTER FOR SCREENING FOR MALIGNANT NEOPLASM OF COLON: ICD-10-CM

## 2022-11-11 PROCEDURE — 99203 OFFICE O/P NEW LOW 30 MIN: CPT

## 2022-11-11 RX ORDER — ATORVASTATIN CALCIUM 40 MG/1
40 TABLET, FILM COATED ORAL
Qty: 21 | Refills: 0 | Status: ACTIVE | COMMUNITY
Start: 2022-03-15

## 2022-11-11 RX ORDER — SODIUM PICOSULFATE, MAGNESIUM OXIDE, AND ANHYDROUS CITRIC ACID 10; 3.5; 12 MG/160ML; G/160ML; G/160ML
10-3.5-12 MG-GM LIQUID ORAL
Qty: 2 | Refills: 0 | Status: ACTIVE | COMMUNITY
Start: 2022-11-11 | End: 1900-01-01

## 2022-11-11 NOTE — ASSESSMENT
[FreeTextEntry1] : Patient is a 62 year female, with PMH mitral valve prolapse, arthritis, HLD, who presents for colon cancer screening. Patient has a family h/o colon cancer in her father. Her most recent colonoscopy was in 2016, negative for polyps. \par \par Colonoscopy to be scheduled. I have discussed the indications, risks and benefits of procedure with patient. Risks include, but not limited to, bleeding, perforation, infection, and reaction to anesthesia. Alternatives to colonoscopy discussed with patient. Patient was given the opportunity to ask questions, all questions were answered. The patient agrees to proceed with colonoscopy. Patient is medically optimized for colonoscopy. \par \par Clenpiq prep to be used. Bowel prep instructions discussed at length. \par \par Will retrieve labs from PCP for review. \par

## 2022-11-11 NOTE — PHYSICAL EXAM
[Alert] : alert [Normal Voice/Communication] : normal voice/communication [Healthy Appearing] : healthy appearing [No Acute Distress] : no acute distress [Sclera] : the sclera and conjunctiva were normal [Hearing Threshold Finger Rub Not [place]] : hearing was normal [Normal Lips/Gums] : the lips and gums were normal [Oropharynx] : the oropharynx was normal [Normal Appearance] : the appearance of the neck was normal [No Neck Mass] : no neck mass was observed [No Respiratory Distress] : no respiratory distress [No Acc Muscle Use] : no accessory muscle use [Respiration, Rhythm And Depth] : normal respiratory rhythm and effort [Auscultation Breath Sounds / Voice Sounds] : lungs were clear to auscultation bilaterally [Heart Rate And Rhythm] : heart rate was normal and rhythm regular [Normal S1, S2] : normal S1 and S2 [Bowel Sounds] : normal bowel sounds [Abdomen Tenderness] : non-tender [No Masses] : no abdominal mass palpated [Abdomen Soft] : soft [] : no hepatosplenomegaly [Oriented To Time, Place, And Person] : oriented to person, place, and time

## 2022-11-11 NOTE — REASON FOR VISIT
[Consultation] : a consultation visit [FreeTextEntry1] : colon cancer screening, family h/o colon cancer

## 2023-01-17 ENCOUNTER — APPOINTMENT (OUTPATIENT)
Dept: GASTROENTEROLOGY | Facility: GI CENTER | Age: 63
End: 2023-01-17
Payer: COMMERCIAL

## 2023-01-17 ENCOUNTER — OUTPATIENT (OUTPATIENT)
Dept: OUTPATIENT SERVICES | Facility: HOSPITAL | Age: 63
LOS: 1 days | End: 2023-01-17
Payer: COMMERCIAL

## 2023-01-17 DIAGNOSIS — Z98.890 OTHER SPECIFIED POSTPROCEDURAL STATES: Chronic | ICD-10-CM

## 2023-01-17 DIAGNOSIS — Z80.0 FAMILY HISTORY OF MALIGNANT NEOPLASM OF DIGESTIVE ORGANS: ICD-10-CM

## 2023-01-17 DIAGNOSIS — Z12.11 ENCOUNTER FOR SCREENING FOR MALIGNANT NEOPLASM OF COLON: ICD-10-CM

## 2023-01-17 LAB — SARS-COV-2 N GENE NPH QL NAA+PROBE: NOT DETECTED

## 2023-01-17 PROCEDURE — 45378 DIAGNOSTIC COLONOSCOPY: CPT | Mod: PT

## 2023-01-17 PROCEDURE — G0105: CPT

## 2023-01-17 NOTE — PROCEDURE
[With Biopsy] : without biopsy [Fm Hx of Colon Ca/Polyps] : family history of colon cancer and/or polyps [Procedure Explained] : The procedure was explained [Allergies Reviewed] : allergies reviewed. [Risks] : Risks [Benefits] : benefits [Alternatives] : alternatives [Bleeding] : bleeding risk [Consent Obtained] : written consent was obtained prior to the procedure and is detailed in the patient's record [Patient] : the patient [Automated Blood Pressure Cuff] : automated blood pressure cuff [Cardiac Monitor] : cardiac monitor [Prep Qualtiy: ___] : Prep Quality:  [unfilled] [Withdrawal Time: ___] : Withdrawal Time:  [unfilled] [Cecum (Landmarks/Transillum)] : and guided to the cecum which was identified by the anatomic landmarks of the appendiceal orifice and ileocecal valve and by transillumination in the right lower quadrant [Normal] : Normal [Tolerated Well] : the patient tolerated the procedure well [de-identified] : As per anesthesia record

## 2023-01-17 NOTE — PROCEDURE
[With Biopsy] : without biopsy [Fm Hx of Colon Ca/Polyps] : family history of colon cancer and/or polyps [Procedure Explained] : The procedure was explained [Allergies Reviewed] : allergies reviewed. [Risks] : Risks [Benefits] : benefits [Alternatives] : alternatives [Bleeding] : bleeding risk [Consent Obtained] : written consent was obtained prior to the procedure and is detailed in the patient's record [Patient] : the patient [Automated Blood Pressure Cuff] : automated blood pressure cuff [Cardiac Monitor] : cardiac monitor [Prep Qualtiy: ___] : Prep Quality:  [unfilled] [Withdrawal Time: ___] : Withdrawal Time:  [unfilled] [Cecum (Landmarks/Transillum)] : and guided to the cecum which was identified by the anatomic landmarks of the appendiceal orifice and ileocecal valve and by transillumination in the right lower quadrant [Normal] : Normal [Tolerated Well] : the patient tolerated the procedure well [de-identified] : As per anesthesia record

## 2023-08-04 ENCOUNTER — APPOINTMENT (OUTPATIENT)
Dept: ORTHOPEDIC SURGERY | Facility: CLINIC | Age: 63
End: 2023-08-04
Payer: COMMERCIAL

## 2023-08-04 VITALS
DIASTOLIC BLOOD PRESSURE: 77 MMHG | HEIGHT: 66 IN | SYSTOLIC BLOOD PRESSURE: 143 MMHG | HEART RATE: 110 BPM | WEIGHT: 240 LBS | BODY MASS INDEX: 38.57 KG/M2

## 2023-08-04 PROCEDURE — 73502 X-RAY EXAM HIP UNI 2-3 VIEWS: CPT | Mod: RT

## 2023-08-04 PROCEDURE — 99203 OFFICE O/P NEW LOW 30 MIN: CPT

## 2023-08-04 PROCEDURE — 73562 X-RAY EXAM OF KNEE 3: CPT | Mod: RT

## 2023-08-04 RX ORDER — MELOXICAM 15 MG/1
15 TABLET ORAL
Qty: 21 | Refills: 0 | Status: ACTIVE | COMMUNITY
Start: 2023-08-04 | End: 1900-01-01

## 2023-08-04 RX ORDER — CYCLOBENZAPRINE HYDROCHLORIDE 5 MG/1
5 TABLET, FILM COATED ORAL 3 TIMES DAILY
Qty: 21 | Refills: 0 | Status: ACTIVE | COMMUNITY
Start: 2023-08-04 | End: 1900-01-01

## 2023-08-04 NOTE — REASON FOR VISIT
[Initial Visit] : an initial visit for [FreeTextEntry2] : Right hip Right knee injury DOI: 08/03/2023

## 2023-08-04 NOTE — HISTORY OF PRESENT ILLNESS
[de-identified] : DOI: 08/03/2023 8/4/2023: Prudence is a 63-year-old female who presents to the office today for evaluation of a right thigh and hip injury.  The patient states that she slipped on her wet kitchen floor yesterday morning and that a front split with her right leg going forward and her left leg going backward.  On the fall down she banged the outer part of her right hip against the countertop and she hit her knee as well.  Since that time she has had severe pain particularly in the back of the thigh and buttock.  She also has pain on the outer part of her hip.  After the injury she did experience numbness and tingling which extended down from her leg into her foot.  This has since resolved.  Initially she was unable to bear weight but is now able to walk with the assistance of a walker.  She has never injured this before.  She has not had any formal treatment but has been resting, icing, and elevating the hip on her own.  Her  gave her some oxycodone but this made her nauseous.  The patient denies any fevers, chills, sweats, recent illnesses, numbness, tingling, weakness, or pain elsewhere at this time.

## 2023-08-04 NOTE — PHYSICAL EXAM
[de-identified] : General: Awake, alert, no acute distress, Patient was cooperative and appropriate during the examination.  The patient's weight is obese for height and age.  Walks with without an antalgic gait.  Full, painless range of motion of the neck and back.  Exam of the bilateral lower extremities is intact and symmetric with regards to dermatologic, vascular, and neurologic exam. Bilateral lower extremity sensation is grossly intact to light touch in the DP/SP/T/S/S nerve distributions. Intact DF/PF/EHL. BIlateral lower extremity warm and well-perfused with brisk capillary refill.  Pulmonary: Regular, nonlabored breathing  Abdomen: Soft, nontender, nondistended.  Lymphatic: No evidence of inguinal lymphadenopathy    Right hip and thigh examination: Physical examination of the hip demonstrates normal skin without signs of skin changes or abnormalities. No erythema, warmth, or joint effusion is appreciated.  There is no bruising or ecchymosis.  Sensation is intact to light touch L2-S1 Palpable DP/PT pulse EHL/FHL/TA/GSC motor function intact  Range of Motion 60 degrees of flexion to full extension limited by pain 30 degrees of internal rotation 30 degrees of external rotation limited by pain in patient positioning  Strength Testing Hip Flexors/Hip Extensors 5/5 Hip Abductors/Hip Adductors 5/5 Quadriceps/Hamstring 5/5 Patient is able to perform a straight leg raise without difficulty.  Palpation Mildly tender to palpation about the greater trochanter Not tender to palpation about the hip joint Not tender to palpation about the lumbar spinous processes Exquisitely tender to palpation about the ischium and proximal hamstrings  Special Tests ARNALDO test negative FADIR test negative William test negative Straight leg raise test positive for pain at the greater trochanter Logroll negative Heel strike negative   [de-identified] : X-rays including AP pelvis and 2 views of the right hip as well as multiple views of the right knee were obtained in the office on 8/4/2023 and reviewed with the patient.  There is no acute fracture or dislocation.  There is mild degenerative changes of the bilateral hips as well as the knee.

## 2023-08-04 NOTE — DISCUSSION/SUMMARY
[de-identified] : Assessment: 63-year-old female with right hip and thigh pain secondary to hamstring tendon injury, less likely occult hip fracture  Plan: I had a long discussion with the patient today regarding the nature of their diagnosis and treatment plan. We discussed the risks and benefits of no treatment as well as nonoperative and operative treatments.  I reviewed the patient's x-rays today with her in the office which are negative for fracture.  On examination most of the patient's pain is localized to the proximal posterior thigh.  Patient is able to straight leg raise and there is a negative heel strike and logroll on examination.  She has also been bearing weight with the assistance of a walker.  At this time I recommend an MRI to rule out a possible acute traumatic full-thickness tear of the proximal hamstrings tendons and also to rule out occult hip fracture.  She may continue to ambulate with the assistance of a walker.  Recommend follow-up after the MRI over the phone to discuss results and any further recommendations  The patient verbalizes their understanding and agrees with the plan.  All questions were answered to their satisfaction.

## 2023-09-05 ENCOUNTER — APPOINTMENT (OUTPATIENT)
Dept: ORTHOPEDIC SURGERY | Facility: CLINIC | Age: 63
End: 2023-09-05
Payer: COMMERCIAL

## 2023-09-05 VITALS
WEIGHT: 240 LBS | BODY MASS INDEX: 38.57 KG/M2 | DIASTOLIC BLOOD PRESSURE: 82 MMHG | HEIGHT: 66 IN | SYSTOLIC BLOOD PRESSURE: 135 MMHG | HEART RATE: 73 BPM

## 2023-09-05 PROCEDURE — 99213 OFFICE O/P EST LOW 20 MIN: CPT

## 2023-09-05 NOTE — DISCUSSION/SUMMARY
[de-identified] : Assessment: 63-year-old female with right hip and thigh pain secondary to hamstring tendon injury, trochanteric bursitis  Plan: I had a long discussion with the patient today regarding the nature of their diagnosis and treatment plan. We discussed the risks and benefits of no treatment as well as nonoperative and operative treatments.  I reviewed the patient's x-rays today with her in the office which are negative for fracture.  On examination most of the patient's pain is localized to the proximal posterior thigh.  As well as over the lateral aspect of the hip.  This time I am recommending that we continue with conservative treatment clued ice, heat, rest, Mobic 15 mg once daily with food for pain and inflammation, physical therapy for symptomatic relief.  GI precautions were discussed and prescriptions were provided today.  I am also recommending that she have the MRI completed as previously recommended for further evaluation.  She will follow-up after the MRI is complete for repeat evaluation the office.  We did discuss potential cortisone injections in the future pending the MRI results and pending reexamination.  Patient discussed and reviewed with Dr. Russell today.  The patient verbalizes their understanding and agrees with the plan.  All questions were answered to their satisfaction.

## 2023-09-05 NOTE — HISTORY OF PRESENT ILLNESS
[de-identified] : DOI: 08/03/2023 09/05/2023 : PRUDENCE HERNÁNDEZ  is a 63 year  old female who presents to the office for follow-up evaluation of her right hip and thigh pain.  She states that since last office visit she was getting a little better with rest and ice and heat and compression but still has pain over the posterior and lateral aspect of the hip that is worse when she is on her feet for long periods of time and worse when she bends over and alleviated with rest.  She states that she walks abnormally because of her knees which she has arthritis for.  She states that the pain is over the posterior aspect of the hamstring and into the buttocks and over the lateral aspect of the hip.  She denies any numbness or tingling distally.  She denies any worsening injury.  She did not get the MRI as previously recommended.  8/4/2023: Prudence is a 63-year-old female who presents to the office today for evaluation of a right thigh and hip injury.  The patient states that she slipped on her wet kitchen floor yesterday morning and that a front split with her right leg going forward and her left leg going backward.  On the fall down she banged the outer part of her right hip against the countertop and she hit her knee as well.  Since that time she has had severe pain particularly in the back of the thigh and buttock.  She also has pain on the outer part of her hip.  After the injury she did experience numbness and tingling which extended down from her leg into her foot.  This has since resolved.  Initially she was unable to bear weight but is now able to walk with the assistance of a walker.  She has never injured this before.  She has not had any formal treatment but has been resting, icing, and elevating the hip on her own.  Her  gave her some oxycodone but this made her nauseous.  The patient denies any fevers, chills, sweats, recent illnesses, numbness, tingling, weakness, or pain elsewhere at this time.

## 2023-09-05 NOTE — PHYSICAL EXAM
[de-identified] : General: Awake, alert, no acute distress, Patient was cooperative and appropriate during the examination.  The patient's weight is obese for height and age.  Walks with without an antalgic gait.  Full, painless range of motion of the neck and back.  Exam of the bilateral lower extremities is intact and symmetric with regards to dermatologic, vascular, and neurologic exam. Bilateral lower extremity sensation is grossly intact to light touch in the DP/SP/T/S/S nerve distributions. Intact DF/PF/EHL. BIlateral lower extremity warm and well-perfused with brisk capillary refill.  Pulmonary: Regular, nonlabored breathing  Abdomen: Soft, nontender, nondistended.  Lymphatic: No evidence of inguinal lymphadenopathy    Right hip and thigh examination: Physical examination of the hip demonstrates normal skin without signs of skin changes or abnormalities. No erythema, warmth, or joint effusion is appreciated.  There is no bruising or ecchymosis.  Sensation is intact to light touch L2-S1 Palpable DP/PT pulse EHL/FHL/TA/GSC motor function intact  Range of Motion 120 degrees of flexion to full extension  30 degrees of internal rotation 70 degrees of external rotation   Strength Testing Hip Flexors/Hip Extensors 5/5 Hip Abductors/Hip Adductors 5/5 Quadriceps/Hamstring 5/5 Patient is able to perform a straight leg raise without difficulty.  Palpation Moderately tender to palpation about the greater trochanter Not tender to palpation about the hip joint Not tender to palpation about the lumbar spinous processes Moderately tender to palpation about the ischium and proximal hamstrings  Special Tests ARNALDO test negative FADIR test negative William test negative Straight leg raise test positive for pain at the greater trochanter Logroll negative Heel strike negative   [de-identified] : X-rays including AP pelvis and 2 views of the right hip as well as multiple views of the right knee were obtained in the office on 8/4/2023 and reviewed with the patient.  There is no acute fracture or dislocation.  There is mild degenerative changes of the bilateral hips as well as the knee.

## 2023-10-04 ENCOUNTER — APPOINTMENT (OUTPATIENT)
Dept: ORTHOPEDIC SURGERY | Facility: CLINIC | Age: 63
End: 2023-10-04
Payer: COMMERCIAL

## 2023-10-04 VITALS
HEART RATE: 90 BPM | DIASTOLIC BLOOD PRESSURE: 78 MMHG | BODY MASS INDEX: 38.57 KG/M2 | WEIGHT: 240 LBS | HEIGHT: 66 IN | SYSTOLIC BLOOD PRESSURE: 146 MMHG

## 2023-10-04 PROCEDURE — 99213 OFFICE O/P EST LOW 20 MIN: CPT

## 2023-11-14 ENCOUNTER — APPOINTMENT (OUTPATIENT)
Dept: ORTHOPEDIC SURGERY | Facility: CLINIC | Age: 63
End: 2023-11-14
Payer: COMMERCIAL

## 2023-11-14 VITALS
HEIGHT: 66 IN | HEART RATE: 66 BPM | WEIGHT: 240 LBS | SYSTOLIC BLOOD PRESSURE: 131 MMHG | DIASTOLIC BLOOD PRESSURE: 86 MMHG | BODY MASS INDEX: 38.57 KG/M2

## 2023-11-14 DIAGNOSIS — M25.561 PAIN IN RIGHT KNEE: ICD-10-CM

## 2023-11-14 DIAGNOSIS — M25.551 PAIN IN RIGHT HIP: ICD-10-CM

## 2023-11-14 PROCEDURE — 99213 OFFICE O/P EST LOW 20 MIN: CPT

## 2024-04-16 ENCOUNTER — APPOINTMENT (OUTPATIENT)
Dept: DERMATOLOGY | Facility: CLINIC | Age: 64
End: 2024-04-16
Payer: COMMERCIAL

## 2024-04-16 PROCEDURE — 99204 OFFICE O/P NEW MOD 45 MIN: CPT

## 2024-07-11 ENCOUNTER — NON-APPOINTMENT (OUTPATIENT)
Age: 64
End: 2024-07-11

## 2025-03-12 NOTE — H&P ADULT - MOUTH
What Type Of Note Output Would You Prefer (Optional)?: Standard Output What Is The Reason For Today's Visit?: Full Body Skin Examination with No Concerns What Is The Reason For Today's Visit? (Being Monitored For X): concerning skin lesions on an annual basis moist

## (undated) DEVICE — STERIS DEFENDO 3-PIECE KIT (AIR/WATER, SUCTION & BIOPSY VALVES)